# Patient Record
(demographics unavailable — no encounter records)

---

## 2024-11-07 NOTE — HISTORY OF PRESENT ILLNESS
[TextBox_4] : OLVIN COOK is a 56 year male who presents to the office for an initial sleep evaluation. Patient reports chronic mid-back pain. Chest CT performed October 6th, 2023. Patient's wife in office reports Olvin snores at night. Furthermore, Olvin experiences occasional daytime sleepiness.

## 2024-11-07 NOTE — ADDENDUM
[FreeTextEntry1] : I, Trung Tray, acted solely as a scribe for Dr. Jackelin Hampton D.O. on this date 11/07/2024.   All medical record entries made by the Scribe were at my, Dr. Jackelin Hampton D.O., direction and personally dictated by me on 11/07/2024. I have reviewed the chart and agree that the record accurately reflects my personal performance of the history, physical exam, assessment and plan. I have also personally directed, reviewed, and agreed with the chart.

## 2024-11-07 NOTE — DISCUSSION/SUMMARY
[FreeTextEntry1] : Olvin is a patient with excessive daytime sleepiness and snoring, rule out obstructive sleep apnea.  Secondly, he is a patient with atypical posterior chest pain, likely secondary to costochondritis, rule out PE (unlikely).

## 2024-11-07 NOTE — ASSESSMENT
[FreeTextEntry1] : Dr Hampton reviewed with Olvin his HSS in office today.  HSS suggested that Olvin is suffering from mild SHIREEN.  Dr. Hampton discussed with OLVIN the different treatment options for sleep apnea, including the three CPAP treatments (face mask, nasal mask, nasal pillow), oral appliance and surgical treatments (Inspire pacemaker or otherwise)  Discussed with patient at length regarding the aforementioned sleep study findings and all treatment options in the office today, will start patient on APAP(Auto-titrating positive airway pressure) at 5-20 cm H2O via nasal mask at this point. Olvin should begin using APAP nasal mask and return 1 month after he begins using the machine.  Dr. Hampton discussed with OLVIN dangers of leaving their sleep apnea untreated, including excessive day time sleepiness, liver complications, type 2 diabetes, HTN, impaired decision making especially while driving, increasing the risk of car accidents, early on-set dementia, increased risk of stroke and cardiac arrythmia etc.

## 2025-01-03 NOTE — PHYSICAL EXAM
[Heart Rate And Rhythm] : heart rate was normal and rhythm regular [Heart Sounds] : normal S1 and S2 [Heart Sounds Gallop] : no gallops [Murmurs] : no murmurs [Heart Sounds Pericardial Friction Rub] : no pericardial rub [] : no respiratory distress [Auscultation Breath Sounds / Voice Sounds] : lungs were clear to auscultation bilaterally [No Acute Distress] : no acute distress [Normal Oropharynx] : normal oropharynx [Normal Appearance] : normal appearance [No Neck Mass] : no neck mass [Normal Rate/Rhythm] : normal rate/rhythm [Normal S1, S2] : normal s1, s2 [No Murmurs] : no murmurs [No Resp Distress] : no resp distress [Clear to Auscultation Bilaterally] : clear to auscultation bilaterally [No Abnormalities] : no abnormalities [Benign] : benign [Normal Gait] : normal gait [No Clubbing] : no clubbing [No Cyanosis] : no cyanosis [No Edema] : no edema [FROM] : FROM [Normal Color/ Pigmentation] : normal color/ pigmentation [No Focal Deficits] : no focal deficits [Oriented x3] : oriented x3 [Normal Affect] : normal affect

## 2025-01-05 NOTE — HISTORY OF PRESENT ILLNESS
[FreeTextEntry1] : SHIREEN f/u  Reports no current sleep symptoms. Using PAP on a nightly basis without difficulty. Reports no symptoms of daytime sleepiness. Getting supplies regularly.   Device: Connotate 11  Vendor:   [email or web address]  Mask:  Issues: [TextBox_4] : DENNIS COOK is a 56 year male who presents to the office for an initial sleep evaluation. Hx chronic mid-back pain. Pt presents for sleep f/u. Pt is accompanied with his wife. Pt reports that he is using his APAP machine at night, and snoring has decreased.  Pt has used machine sproadically. Pt reports that he is planning on traveling in the near future.

## 2025-01-05 NOTE — HISTORY OF PRESENT ILLNESS
[FreeTextEntry1] : SHIREEN f/u  Reports no current sleep symptoms. Using PAP on a nightly basis without difficulty. Reports no symptoms of daytime sleepiness. Getting supplies regularly.   Device: Taodyne 11  Vendor:   [employer]  Mask:  Issues: [TextBox_4] : DENNIS COOK is a 56 year male who presents to the office for an initial sleep evaluation. Hx chronic mid-back pain. Pt presents for sleep f/u. Pt is accompanied with his wife. Pt reports that he is using his APAP machine at night, and snoring has decreased.  Pt has used machine sproadically. Pt reports that he is planning on traveling in the near future.

## 2025-01-05 NOTE — END OF VISIT
[FreeTextEntry3] : I, Lolasalvador Payne, acted as a scribe on behalf of Dr. Jackelin Hampton D.O. on 01/03/2025.   All medical entries made by the scribe were at my, Dr. Jackelin Hampton D.O., direction and personally dictated by me on 01/03/2025 . I have reviewed the chart and agree that the record accurately reflects my personal performance of the history, physical exam, assessment and plan. I have also personally directed, reviewed, and agreed with the chart.

## 2025-01-05 NOTE — ASSESSMENT
[FreeTextEntry1] : Dr. Hampton discussed with OLVIN dangers of leaving their sleep apnea untreated, including excessive day time sleepiness, liver complications, type 2 diabetes, HTN, impaired decision making especially while driving, increasing the risk of car accidents, early on-set dementia, increased risk of stroke and cardiac arrythmia etc.  - Pulmonary compliance report reviewed with pt in office today - Pt is not compliant, but benefiting from APAP usage.  Strongly advised Olvin on increasing compliance for his underlying obstructive sleep apnea.  Return to office in one month for sleep f/u

## 2025-01-05 NOTE — PROCEDURE
[FreeTextEntry1] : Home study showed evidence of mild obstructive sleep apnea with overall AHI of 7 events per hour of sleep.

## 2025-01-05 NOTE — HISTORY OF PRESENT ILLNESS
[FreeTextEntry1] : SHIREEN f/u  Reports no current sleep symptoms. Using PAP on a nightly basis without difficulty. Reports no symptoms of daytime sleepiness. Getting supplies regularly.   Device: [employer]Near 11  Vendor:   [email or web address]  Mask:  Issues: [TextBox_4] : DENNIS COOK is a 56 year male who presents to the office for an initial sleep evaluation. Hx chronic mid-back pain. Pt presents for sleep f/u. Pt is accompanied with his wife. Pt reports that he is using his APAP machine at night, and snoring has decreased.  Pt has used machine sproadically. Pt reports that he is planning on traveling in the near future.

## 2025-02-06 NOTE — SIGNATURES
[TextEntry] : This note was written by Stacie Florentino on 02/05/2025 acting as medical scribe for Dr. Jackelin Hampton. I, Dr. Jackelin Hampton, have read and attest that all the information, medical decision making and discharge instructions within are true and accurate.

## 2025-02-06 NOTE — ASSESSMENT
[FreeTextEntry1] : Reviewed downloaded APAP compliance data with patient demonstrating excellent compliance. Patient is compliant and benefiting from APAP usage.   Return to office in 6 months for sleep f/u.

## 2025-02-06 NOTE — HISTORY OF PRESENT ILLNESS
[FreeTextEntry1] : OLVIN COOK is a 56 year male who presents to the office for follow sleep evaluation. Olvin has started using PAP as treatment for sleep apnea and reports that he is already seeing benefit. Reports no sleep symptoms at this time.

## 2025-06-13 NOTE — PHYSICAL EXAM
[General Appearance - Well Developed] : well developed [Normal Appearance] : normal appearance [Well Groomed] : well groomed [General Appearance - Well Nourished] : well nourished [No Deformities] : no deformities [General Appearance - In No Acute Distress] : no acute distress [Normal Oropharynx] : normal oropharynx [Heart Sounds] : normal S1 and S2 [Heart Rate And Rhythm] : heart rate was normal and rhythm regular [Heart Sounds Gallop] : no gallops [Murmurs] : no murmurs [Heart Sounds Pericardial Friction Rub] : no pericardial rub [] : no respiratory distress [Auscultation Breath Sounds / Voice Sounds] : lungs were clear to auscultation bilaterally

## 2025-06-15 NOTE — HISTORY OF PRESENT ILLNESS
[FreeTextEntry1] : OLVIN COOK is a 56 year male, with history of mild SHIREEN, hypersomnolence, hyperlipidemia, GERD, who presents to the office for a follow up pulmonary sleep evaluation. OLVIN reports to be feeling reasonably well at this time with no respiratory or sleep complaints. Olvin notes compliance in use of PAP for treatment of his mild SHIREEN and states that he has been tolerating it well. He also states that he does not feel sleepy during the daytime any longer. His wife also notes he no longer snores at night with use of PAP.

## 2025-06-15 NOTE — SIGNATURES
[TextEntry] : This note was written by Issac Lemon on 06/13/2025 acting as medical scribe for Dr. Jackelin Hampton. I, Dr. Jackelin Hampton, have read and attest that all the information, medical decision making and discharge instructions within are true and accurate.

## 2025-06-15 NOTE — ASSESSMENT
[FreeTextEntry1] : Auto-titrating Positive Airway Pressure (APAP) compliance data downloaded and reviewed with patient in office today. DENNIS is compliant and benefiting from APAP usage.  Educated DENNIS that to achieve compliance he has to be using PAP device at minimum 4HRS per night, 70% of nights. Again, acknowledged understanding.  Dr. Hampton's sleep recommendation: At this point, DENNIS has been strongly advised to continue his compliance with his PAP machine for treatment of his SHIREEN. DENNIS should return for a sleep follow up visit in 3 months.